# Patient Record
Sex: FEMALE | Race: WHITE | Employment: FULL TIME | URBAN - METROPOLITAN AREA
[De-identification: names, ages, dates, MRNs, and addresses within clinical notes are randomized per-mention and may not be internally consistent; named-entity substitution may affect disease eponyms.]

---

## 2017-06-28 ENCOUNTER — HOSPITAL ENCOUNTER (OUTPATIENT)
Age: 42
Discharge: HOME | End: 2017-06-28
Attending: EMERGENCY MEDICINE
Payer: COMMERCIAL

## 2017-06-28 VITALS
DIASTOLIC BLOOD PRESSURE: 77 MMHG | SYSTOLIC BLOOD PRESSURE: 120 MMHG | OXYGEN SATURATION: 100 % | HEART RATE: 86 BPM | TEMPERATURE: 99 F | RESPIRATION RATE: 16 BRPM

## 2017-06-28 DIAGNOSIS — N30.00 ACUTE CYSTITIS WITHOUT HEMATURIA: Primary | ICD-10-CM

## 2017-06-28 PROCEDURE — 87186 SC STD MICRODIL/AGAR DIL: CPT | Performed by: EMERGENCY MEDICINE

## 2017-06-28 PROCEDURE — 87088 URINE BACTERIA CULTURE: CPT | Performed by: EMERGENCY MEDICINE

## 2017-06-28 PROCEDURE — 81002 URINALYSIS NONAUTO W/O SCOPE: CPT | Performed by: EMERGENCY MEDICINE

## 2017-06-28 PROCEDURE — 99214 OFFICE O/P EST MOD 30 MIN: CPT

## 2017-06-28 PROCEDURE — 87086 URINE CULTURE/COLONY COUNT: CPT | Performed by: EMERGENCY MEDICINE

## 2017-06-28 PROCEDURE — 81025 URINE PREGNANCY TEST: CPT | Performed by: EMERGENCY MEDICINE

## 2017-06-28 RX ORDER — NITROFURANTOIN 25; 75 MG/1; MG/1
100 CAPSULE ORAL 2 TIMES DAILY
Qty: 14 CAPSULE | Refills: 0 | Status: SHIPPED | OUTPATIENT
Start: 2017-06-28 | End: 2017-07-05

## 2017-06-28 RX ORDER — PHENAZOPYRIDINE HYDROCHLORIDE 200 MG/1
200 TABLET, FILM COATED ORAL 3 TIMES DAILY PRN
Qty: 6 TABLET | Refills: 0 | Status: SHIPPED | OUTPATIENT
Start: 2017-06-28 | End: 2017-07-05

## 2017-06-29 NOTE — ED PROVIDER NOTES
Patient presents with:  UTI    HPI:     Darvin Parada is a 39year old female who presents with chief complaint of dysuria, suprapubic pressure. Started with slight discomfort two days ago. Yesterday a little nauseous.   Now today with urgency, frequenc and pyridium Rx  2. Urine cx  3. F/u with PCP in 3 days as needed for re-evaluation, sooner if symptoms worsen      All results reviewed and discussed with patient. See AVS for detailed discharge instructions.

## 2017-09-05 ENCOUNTER — CHARTING TRANS (OUTPATIENT)
Dept: OTHER | Age: 42
End: 2017-09-05

## 2017-09-05 ENCOUNTER — LAB SERVICES (OUTPATIENT)
Dept: OTHER | Age: 42
End: 2017-09-05

## 2017-09-05 LAB
APPEARANCE: NORMAL
BILIRUBIN: NORMAL
COLOR: YELLOW
GLUCOSE U: NORMAL
KETONES: NORMAL
LEUKOCYTES: NORMAL
NITRITE: NORMAL
OCCULT BLOOD: NORMAL
PH: 7
PROTEIN: NORMAL
URINE SPEC GRAVITY: 1
UROBILINOGEN: 0.2

## 2017-09-07 LAB — BACTERIA UR CULT: NORMAL

## 2018-11-02 VITALS
RESPIRATION RATE: 16 BRPM | SYSTOLIC BLOOD PRESSURE: 130 MMHG | TEMPERATURE: 98.6 F | DIASTOLIC BLOOD PRESSURE: 76 MMHG | HEART RATE: 90 BPM

## 2019-07-31 ENCOUNTER — WALK IN (OUTPATIENT)
Dept: URGENT CARE | Age: 44
End: 2019-07-31

## 2019-07-31 DIAGNOSIS — H10.9 BACTERIAL CONJUNCTIVITIS: Primary | ICD-10-CM

## 2019-07-31 PROCEDURE — 99214 OFFICE O/P EST MOD 30 MIN: CPT | Performed by: NURSE PRACTITIONER

## 2019-07-31 RX ORDER — TOBRAMYCIN 3 MG/ML
1 SOLUTION/ DROPS OPHTHALMIC EVERY 6 HOURS
Qty: 5 ML | Refills: 0 | Status: SHIPPED | OUTPATIENT
Start: 2019-07-31 | End: 2019-08-07

## 2019-07-31 RX ORDER — ESCITALOPRAM OXALATE 10 MG/1
10 TABLET ORAL DAILY
COMMUNITY

## 2019-07-31 RX ORDER — DEXTROAMPHETAMINE SACCHARATE, AMPHETAMINE ASPARTATE, DEXTROAMPHETAMINE SULFATE AND AMPHETAMINE SULFATE 7.5; 7.5; 7.5; 7.5 MG/1; MG/1; MG/1; MG/1
20 TABLET ORAL 2 TIMES DAILY
COMMUNITY

## 2019-07-31 RX ORDER — ATOMOXETINE 100 MG/1
100 CAPSULE ORAL DAILY
COMMUNITY

## 2019-07-31 RX ORDER — QUETIAPINE FUMARATE 100 MG/1
400 TABLET, FILM COATED ORAL DAILY
COMMUNITY

## 2019-07-31 RX ORDER — LEVOTHYROXINE SODIUM 0.1 MG/1
50 TABLET ORAL DAILY
COMMUNITY

## 2019-07-31 ASSESSMENT — ENCOUNTER SYMPTOMS
PHOTOPHOBIA: 0
EYE DISCHARGE: 1
EYE REDNESS: 1
EYE PAIN: 0
CONSTITUTIONAL NEGATIVE: 1
EYE ITCHING: 1
RESPIRATORY NEGATIVE: 1

## 2019-09-25 ENCOUNTER — HOSPITAL ENCOUNTER (OUTPATIENT)
Age: 44
Discharge: HOME | End: 2019-09-25
Attending: FAMILY MEDICINE
Payer: COMMERCIAL

## 2019-09-25 VITALS
DIASTOLIC BLOOD PRESSURE: 65 MMHG | TEMPERATURE: 97 F | OXYGEN SATURATION: 98 % | HEART RATE: 102 BPM | WEIGHT: 130 LBS | HEIGHT: 62 IN | BODY MASS INDEX: 23.92 KG/M2 | RESPIRATION RATE: 20 BRPM | SYSTOLIC BLOOD PRESSURE: 107 MMHG

## 2019-09-25 DIAGNOSIS — Z87.19 HISTORY OF CONSTIPATION: ICD-10-CM

## 2019-09-25 DIAGNOSIS — N12 PYELONEPHRITIS: Primary | ICD-10-CM

## 2019-09-25 LAB
POCT BILIRUBIN URINE: NEGATIVE
POCT GLUCOSE URINE: NEGATIVE MG/DL
POCT KETONE URINE: NEGATIVE MG/DL
POCT NITRITE URINE: NEGATIVE
POCT PH URINE: 7.5 (ref 5–8)
POCT SPECIFIC GRAVITY URINE: 1.01
POCT URINE COLOR: YELLOW
POCT URINE PREGNANCY: NEGATIVE
POCT UROBILINOGEN URINE: 0.2 MG/DL

## 2019-09-25 PROCEDURE — 99214 OFFICE O/P EST MOD 30 MIN: CPT

## 2019-09-25 PROCEDURE — 81025 URINE PREGNANCY TEST: CPT | Performed by: FAMILY MEDICINE

## 2019-09-25 PROCEDURE — 87077 CULTURE AEROBIC IDENTIFY: CPT | Performed by: FAMILY MEDICINE

## 2019-09-25 PROCEDURE — 81002 URINALYSIS NONAUTO W/O SCOPE: CPT | Performed by: FAMILY MEDICINE

## 2019-09-25 PROCEDURE — 87186 SC STD MICRODIL/AGAR DIL: CPT | Performed by: FAMILY MEDICINE

## 2019-09-25 PROCEDURE — 87086 URINE CULTURE/COLONY COUNT: CPT | Performed by: FAMILY MEDICINE

## 2019-09-25 RX ORDER — CIPROFLOXACIN 500 MG/1
500 TABLET, FILM COATED ORAL 2 TIMES DAILY
Qty: 20 TABLET | Refills: 0 | Status: SHIPPED | OUTPATIENT
Start: 2019-09-25 | End: 2019-10-05

## 2019-09-25 NOTE — ED PROVIDER NOTES
Patient Seen in: [address] [facility]      History   Patient presents with:   Body ache and/or chills  Low Back Pain  Fever (infectious)    Stated Complaint: body aches/fever    HPI  This is a 28-year-old female coming in with lower back pain, Not in any acute distress, making good conversation, answering appropriately   SKIN: No pallor, no erythema, no cyanosis, warm and dry  Eyes: wnl, normal conjunctiva   HEAD: Normocephalic, atraumatic  EENT: OP - wnl, moist, Nares normal  NECK: FROM, supple increase urinary flow by hydrating well   Worsening of symptoms please GO TO THE ER           Disposition and Plan     Clinical Impression:  Pyelonephritis  (primary encounter diagnosis)  History of constipation    Disposition:  Discharge  9/25/2019 10:57

## 2019-09-27 NOTE — ED NOTES
Attempted to call patient with urine culture results. No voicemail or answer. Urine culture is positive, but sensitive to Cipro should patient call for results. Unable to leave message.

## 2019-10-19 PROBLEM — Z98.890 H/O LEEP: Status: ACTIVE | Noted: 2019-10-19

## 2019-10-19 PROBLEM — Z97.5 IUD CONTRACEPTION: Status: ACTIVE | Noted: 2019-10-19

## 2019-10-19 PROCEDURE — 88175 CYTOPATH C/V AUTO FLUID REDO: CPT | Performed by: OBSTETRICS & GYNECOLOGY

## 2019-10-19 PROCEDURE — 87624 HPV HI-RISK TYP POOLED RSLT: CPT | Performed by: OBSTETRICS & GYNECOLOGY

## 2019-10-24 PROBLEM — E21.3 HYPERPARATHYROIDISM (HCC): Status: ACTIVE | Noted: 2019-10-24

## 2019-10-24 PROBLEM — Z98.890 S/P LAPAROSCOPY: Status: ACTIVE | Noted: 2019-10-24

## 2019-12-14 ENCOUNTER — WALK IN (OUTPATIENT)
Dept: URGENT CARE | Age: 44
End: 2019-12-14

## 2019-12-14 VITALS
HEART RATE: 92 BPM | BODY MASS INDEX: 24.84 KG/M2 | RESPIRATION RATE: 16 BRPM | HEIGHT: 62 IN | SYSTOLIC BLOOD PRESSURE: 126 MMHG | WEIGHT: 135 LBS | DIASTOLIC BLOOD PRESSURE: 78 MMHG | TEMPERATURE: 98.8 F | OXYGEN SATURATION: 100 %

## 2019-12-14 DIAGNOSIS — J01.90 ACUTE NON-RECURRENT SINUSITIS, UNSPECIFIED LOCATION: Primary | ICD-10-CM

## 2019-12-14 DIAGNOSIS — H10.9 BACTERIAL CONJUNCTIVITIS: ICD-10-CM

## 2019-12-14 PROCEDURE — 99203 OFFICE O/P NEW LOW 30 MIN: CPT | Performed by: NURSE PRACTITIONER

## 2019-12-14 RX ORDER — ESCITALOPRAM OXALATE 10 MG/1
10 TABLET ORAL DAILY
Refills: 3 | COMMUNITY
Start: 2019-11-16

## 2019-12-14 RX ORDER — DOXYCYCLINE HYCLATE 100 MG
100 TABLET ORAL 2 TIMES DAILY
Qty: 14 TABLET | Refills: 0 | Status: SHIPPED | OUTPATIENT
Start: 2019-12-14 | End: 2019-12-21

## 2019-12-14 RX ORDER — DEXTROAMPHETAMINE SACCHARATE, AMPHETAMINE ASPARTATE, DEXTROAMPHETAMINE SULFATE AND AMPHETAMINE SULFATE 2.5; 2.5; 2.5; 2.5 MG/1; MG/1; MG/1; MG/1
TABLET ORAL
Refills: 0 | COMMUNITY
Start: 2019-11-15

## 2019-12-14 RX ORDER — FLUOCINOLONE ACETONIDE 0.11 MG/ML
OIL AURICULAR (OTIC)
Refills: 11 | COMMUNITY
Start: 2019-10-14

## 2019-12-14 RX ORDER — DEXTROAMPHETAMINE SACCHARATE, AMPHETAMINE ASPARTATE MONOHYDRATE, DEXTROAMPHETAMINE SULFATE AND AMPHETAMINE SULFATE 5; 5; 5; 5 MG/1; MG/1; MG/1; MG/1
CAPSULE, EXTENDED RELEASE ORAL
Refills: 0 | COMMUNITY
Start: 2019-11-15

## 2019-12-14 RX ORDER — ATOMOXETINE 100 MG/1
CAPSULE ORAL
Refills: 1 | COMMUNITY
Start: 2019-09-08

## 2019-12-14 RX ORDER — LEVOTHYROXINE SODIUM 0.05 MG/1
TABLET ORAL
Refills: 3 | COMMUNITY
Start: 2019-10-14

## 2019-12-14 RX ORDER — POLYMYXIN B SULFATE AND TRIMETHOPRIM 1; 10000 MG/ML; [USP'U]/ML
1 SOLUTION OPHTHALMIC
Qty: 10 ML | Refills: 0 | Status: SHIPPED | OUTPATIENT
Start: 2019-12-14 | End: 2019-12-21

## 2019-12-14 ASSESSMENT — ENCOUNTER SYMPTOMS
ABDOMINAL PAIN: 0
DIZZINESS: 0
VOMITING: 0
PHOTOPHOBIA: 0
SEIZURES: 0
SORE THROAT: 0
EYE DISCHARGE: 1
EYE ITCHING: 0
EYE PAIN: 0
DIARRHEA: 0
RHINORRHEA: 0
SINUS PAIN: 0
SINUS PRESSURE: 1
SHORTNESS OF BREATH: 0
COUGH: 0
FEVER: 0
EYE REDNESS: 1
HEADACHES: 0
CHILLS: 0

## 2020-12-29 ENCOUNTER — LAB ENCOUNTER (OUTPATIENT)
Dept: LAB | Age: 45
End: 2020-12-29
Attending: OTOLARYNGOLOGY
Payer: COMMERCIAL

## 2020-12-29 DIAGNOSIS — E07.9 THYROID DYSFUNCTION: ICD-10-CM

## 2020-12-29 LAB
T4 FREE SERPL-MCNC: 0.8 NG/DL (ref 0.8–1.7)
TSI SER-ACNC: 2.66 MIU/ML (ref 0.36–3.74)

## 2020-12-29 PROCEDURE — 84443 ASSAY THYROID STIM HORMONE: CPT

## 2020-12-29 PROCEDURE — 36415 COLL VENOUS BLD VENIPUNCTURE: CPT

## 2020-12-29 PROCEDURE — 84439 ASSAY OF FREE THYROXINE: CPT

## 2021-08-10 ENCOUNTER — APPOINTMENT (OUTPATIENT)
Dept: CT IMAGING | Facility: HOSPITAL | Age: 46
End: 2021-08-10
Attending: EMERGENCY MEDICINE
Payer: COMMERCIAL

## 2021-08-10 ENCOUNTER — HOSPITAL ENCOUNTER (EMERGENCY)
Facility: HOSPITAL | Age: 46
Discharge: HOME | End: 2021-08-10
Attending: EMERGENCY MEDICINE
Payer: COMMERCIAL

## 2021-08-10 VITALS
HEIGHT: 62 IN | WEIGHT: 138 LBS | TEMPERATURE: 99 F | SYSTOLIC BLOOD PRESSURE: 110 MMHG | OXYGEN SATURATION: 99 % | HEART RATE: 96 BPM | RESPIRATION RATE: 17 BRPM | BODY MASS INDEX: 25.4 KG/M2 | DIASTOLIC BLOOD PRESSURE: 69 MMHG

## 2021-08-10 DIAGNOSIS — R10.9 FLANK PAIN: ICD-10-CM

## 2021-08-10 DIAGNOSIS — N12 PYELONEPHRITIS: Primary | ICD-10-CM

## 2021-08-10 LAB
ALBUMIN SERPL-MCNC: 3.2 G/DL (ref 3.4–5)
ALBUMIN/GLOB SERPL: 0.8 {RATIO} (ref 1–2)
ALP LIVER SERPL-CCNC: 91 U/L
ALT SERPL-CCNC: 34 U/L
ANION GAP SERPL CALC-SCNC: 5 MMOL/L (ref 0–18)
AST SERPL-CCNC: 21 U/L (ref 15–37)
ATRIAL RATE: 109 BPM
BASOPHILS # BLD AUTO: 0.01 X10(3) UL (ref 0–0.2)
BASOPHILS NFR BLD AUTO: 0.1 %
BILIRUB SERPL-MCNC: 0.4 MG/DL (ref 0.1–2)
BILIRUB UR QL STRIP.AUTO: NEGATIVE
BUN BLD-MCNC: 7 MG/DL (ref 7–18)
CALCIUM BLD-MCNC: 8.1 MG/DL (ref 8.5–10.1)
CHLORIDE SERPL-SCNC: 99 MMOL/L (ref 98–112)
CLARITY UR REFRACT.AUTO: CLEAR
CO2 SERPL-SCNC: 26 MMOL/L (ref 21–32)
COLOR UR AUTO: YELLOW
CREAT BLD-MCNC: 0.82 MG/DL
EOSINOPHIL # BLD AUTO: 0 X10(3) UL (ref 0–0.7)
EOSINOPHIL NFR BLD AUTO: 0 %
ERYTHROCYTE [DISTWIDTH] IN BLOOD BY AUTOMATED COUNT: 15.9 %
GLOBULIN PLAS-MCNC: 4.2 G/DL (ref 2.8–4.4)
GLUCOSE BLD-MCNC: 105 MG/DL (ref 70–99)
GLUCOSE UR STRIP.AUTO-MCNC: NEGATIVE MG/DL
HCT VFR BLD AUTO: 32.9 %
HGB BLD-MCNC: 10.5 G/DL
IMM GRANULOCYTES # BLD AUTO: 0.05 X10(3) UL (ref 0–1)
IMM GRANULOCYTES NFR BLD: 0.4 %
KETONES UR STRIP.AUTO-MCNC: NEGATIVE MG/DL
LACTATE SERPL-SCNC: 0.5 MMOL/L (ref 0.4–2)
LEUKOCYTE ESTERASE UR QL STRIP.AUTO: NEGATIVE
LIPASE SERPL-CCNC: 60 U/L (ref 73–393)
LYMPHOCYTES # BLD AUTO: 0.44 X10(3) UL (ref 1–4)
LYMPHOCYTES NFR BLD AUTO: 3.5 %
M PROTEIN MFR SERPL ELPH: 7.4 G/DL (ref 6.4–8.2)
MCH RBC QN AUTO: 24.5 PG (ref 26–34)
MCHC RBC AUTO-ENTMCNC: 31.9 G/DL (ref 31–37)
MCV RBC AUTO: 76.7 FL
MONOCYTES # BLD AUTO: 0.93 X10(3) UL (ref 0.1–1)
MONOCYTES NFR BLD AUTO: 7.4 %
NEUTROPHILS # BLD AUTO: 11.22 X10 (3) UL (ref 1.5–7.7)
NEUTROPHILS # BLD AUTO: 11.22 X10(3) UL (ref 1.5–7.7)
NEUTROPHILS NFR BLD AUTO: 88.6 %
NITRITE UR QL STRIP.AUTO: NEGATIVE
OSMOLALITY SERPL CALC.SUM OF ELEC: 268 MOSM/KG (ref 275–295)
P AXIS: 51 DEGREES
P-R INTERVAL: 128 MS
PH UR STRIP.AUTO: 8 [PH] (ref 5–8)
PLATELET # BLD AUTO: 290 10(3)UL (ref 150–450)
POTASSIUM SERPL-SCNC: 3.7 MMOL/L (ref 3.5–5.1)
PROT UR STRIP.AUTO-MCNC: NEGATIVE MG/DL
Q-T INTERVAL: 332 MS
QRS DURATION: 118 MS
QTC CALCULATION (BEZET): 447 MS
R AXIS: -10 DEGREES
RBC # BLD AUTO: 4.29 X10(6)UL
SARS-COV-2 RNA RESP QL NAA+PROBE: NOT DETECTED
SODIUM SERPL-SCNC: 130 MMOL/L (ref 136–145)
SP GR UR STRIP.AUTO: 1.01 (ref 1–1.03)
T AXIS: 96 DEGREES
TROPONIN I SERPL-MCNC: <0.045 NG/ML (ref ?–0.04)
UROBILINOGEN UR STRIP.AUTO-MCNC: <2 MG/DL
VENTRICULAR RATE: 109 BPM
WBC # BLD AUTO: 12.7 X10(3) UL (ref 4–11)

## 2021-08-10 PROCEDURE — 81001 URINALYSIS AUTO W/SCOPE: CPT | Performed by: EMERGENCY MEDICINE

## 2021-08-10 PROCEDURE — 74176 CT ABD & PELVIS W/O CONTRAST: CPT | Performed by: EMERGENCY MEDICINE

## 2021-08-10 PROCEDURE — 99285 EMERGENCY DEPT VISIT HI MDM: CPT

## 2021-08-10 PROCEDURE — 83605 ASSAY OF LACTIC ACID: CPT | Performed by: EMERGENCY MEDICINE

## 2021-08-10 PROCEDURE — 96375 TX/PRO/DX INJ NEW DRUG ADDON: CPT

## 2021-08-10 PROCEDURE — 96361 HYDRATE IV INFUSION ADD-ON: CPT

## 2021-08-10 PROCEDURE — 80053 COMPREHEN METABOLIC PANEL: CPT | Performed by: EMERGENCY MEDICINE

## 2021-08-10 PROCEDURE — 85025 COMPLETE CBC W/AUTO DIFF WBC: CPT | Performed by: EMERGENCY MEDICINE

## 2021-08-10 PROCEDURE — 84484 ASSAY OF TROPONIN QUANT: CPT | Performed by: EMERGENCY MEDICINE

## 2021-08-10 PROCEDURE — 93010 ELECTROCARDIOGRAM REPORT: CPT

## 2021-08-10 PROCEDURE — 83690 ASSAY OF LIPASE: CPT | Performed by: EMERGENCY MEDICINE

## 2021-08-10 PROCEDURE — 96365 THER/PROPH/DIAG IV INF INIT: CPT

## 2021-08-10 PROCEDURE — 93005 ELECTROCARDIOGRAM TRACING: CPT

## 2021-08-10 PROCEDURE — 81001 URINALYSIS AUTO W/SCOPE: CPT

## 2021-08-10 RX ORDER — ACETAMINOPHEN 325 MG/1
650 TABLET ORAL ONCE
Status: DISCONTINUED | OUTPATIENT
Start: 2021-08-10 | End: 2021-08-10

## 2021-08-10 RX ORDER — CEPHALEXIN 500 MG/1
500 CAPSULE ORAL 4 TIMES DAILY
Qty: 56 CAPSULE | Refills: 0 | Status: SHIPPED | OUTPATIENT
Start: 2021-08-10 | End: 2021-08-24

## 2021-08-10 RX ORDER — ACETAMINOPHEN 500 MG
1000 TABLET ORAL ONCE
Status: COMPLETED | OUTPATIENT
Start: 2021-08-10 | End: 2021-08-10

## 2021-08-10 RX ORDER — HYDROCODONE BITARTRATE AND ACETAMINOPHEN 5; 325 MG/1; MG/1
1-2 TABLET ORAL EVERY 6 HOURS PRN
Qty: 10 TABLET | Refills: 0 | Status: SHIPPED | OUTPATIENT
Start: 2021-08-10 | End: 2021-08-17

## 2021-08-10 RX ORDER — KETOROLAC TROMETHAMINE 30 MG/ML
15 INJECTION, SOLUTION INTRAMUSCULAR; INTRAVENOUS ONCE
Status: COMPLETED | OUTPATIENT
Start: 2021-08-10 | End: 2021-08-10

## 2021-08-10 RX ORDER — ACETAMINOPHEN 500 MG
TABLET ORAL
Status: COMPLETED
Start: 2021-08-10 | End: 2021-08-10

## 2021-08-10 RX ORDER — ACETAMINOPHEN 325 MG/1
TABLET ORAL
Status: DISCONTINUED
Start: 2021-08-10 | End: 2021-08-10 | Stop reason: WASHOUT

## 2021-08-10 NOTE — ED PROVIDER NOTES
Patient Seen in: [facility] Emergency Department      History   Patient presents with:  Abdomen/Flank Pain    Stated Complaint: abdominal pain, slight fever.      HPI/Subjective:   HPI    Patient is a 70-year-old female who states that 3 weeks ago sh resting  on the cart in no acute distress. HEENT: Extraocular muscles intact, pupils equal round reactive to light , neck supple, no meningismus. LUNGS: Lungs clear to auscultation bilaterally.   CARDIOVASCULAR: + S1-S2, regular rate and rhythm, no murmur -----------         ------                     CBC W/ DIFFERENTIAL[[id number]]          Abnormal            Final result                 Please view results for these tests on the individual orders.    RAINBOW DRAW LAVENDER   RAINBOW DRAW LIGHT GREE 1 capsule (500 mg total) by mouth 4 (four) times daily for 14 days. , Normal, Disp-56 capsule, R-0    HYDROcodone-acetaminophen 5-325 MG Oral Tab  Take 1-2 tablets by mouth every 6 (six) hours as needed for Pain., Normal, Disp-10 tablet, R-0

## 2021-08-10 NOTE — ED INITIAL ASSESSMENT (HPI)
Pt states she has a foul urine odor and iritation. Pt states urine was cloudy yesterday. Pt states having frequent UTIs. Pt c/o right flank pain that radiates down bilateral legs.

## 2021-08-13 ENCOUNTER — TELEPHONE (OUTPATIENT)
Dept: SURGERY | Facility: CLINIC | Age: 46
End: 2021-08-13

## 2021-08-13 NOTE — TELEPHONE ENCOUNTER
I spoke to MPH and he stated that the pt could follow up with [facility]. I discussed that her next opening is on 8/24 and he stated this would be an appropriate time frame for the patient. I called the pt and discussed this with her.  Pt verbalized understanding

## 2021-08-13 NOTE — TELEPHONE ENCOUNTER
Per pt was seen at [address] [place] [address] ED on 8/10, states she was seen for kidney infection, states she needs f/u appt in 3 days, no appts available. Please advise thank you.

## 2021-08-23 ENCOUNTER — TELEPHONE (OUTPATIENT)
Dept: SURGERY | Facility: CLINIC | Age: 46
End: 2021-08-23

## 2021-09-08 ENCOUNTER — OFFICE VISIT (OUTPATIENT)
Dept: SURGERY | Facility: CLINIC | Age: 46
End: 2021-09-08
Payer: COMMERCIAL

## 2021-09-08 VITALS
DIASTOLIC BLOOD PRESSURE: 64 MMHG | HEIGHT: 62 IN | HEART RATE: 83 BPM | SYSTOLIC BLOOD PRESSURE: 121 MMHG | BODY MASS INDEX: 25.4 KG/M2 | WEIGHT: 138 LBS

## 2021-09-08 DIAGNOSIS — R31.0 GROSS HEMATURIA: Primary | ICD-10-CM

## 2021-09-08 DIAGNOSIS — R93.89 ABNORMAL FINDING ON CT SCAN: ICD-10-CM

## 2021-09-08 PROCEDURE — 3008F BODY MASS INDEX DOCD: CPT | Performed by: NURSE PRACTITIONER

## 2021-09-08 PROCEDURE — 3078F DIAST BP <80 MM HG: CPT | Performed by: NURSE PRACTITIONER

## 2021-09-08 PROCEDURE — 3074F SYST BP LT 130 MM HG: CPT | Performed by: NURSE PRACTITIONER

## 2021-09-08 PROCEDURE — 99203 OFFICE O/P NEW LOW 30 MIN: CPT | Performed by: NURSE PRACTITIONER

## 2021-09-08 NOTE — PROGRESS NOTES
HPI/Subjective:     Patient ID: Florentin Score is a 55year old female. HPI     Patient is a 55year old female who presents to the clinic for an initial evaluation regarding pyelonephritis. Past medical history of hyperparathyroidism.       Patient wa mouth 2 (two) times daily. • Atomoxetine HCl (STRATTERA) 100 MG Oral Cap Take by mouth daily. • QUEtiapine Fumarate (SEROQUEL) 400 MG Oral Tab Take 400 mg by mouth nightly.      • TRIAMCINOLONE ACETONIDE 0.1 % External Cream APPLY TWICE DAILY AS DIR motion. Cervical back: Normal range of motion. Skin:     General: Skin is warm and dry. Neurological:      General: No focal deficit present. Mental Status: She is alert and oriented to person, place, and time.    Psychiatric:         Mood and

## 2022-04-16 ENCOUNTER — HOSPITAL ENCOUNTER (OUTPATIENT)
Dept: CT IMAGING | Age: 47
Discharge: HOME | End: 2022-04-16
Attending: NURSE PRACTITIONER
Payer: COMMERCIAL

## 2022-04-16 DIAGNOSIS — R31.0 GROSS HEMATURIA: ICD-10-CM

## 2022-04-16 DIAGNOSIS — R93.89 ABNORMAL FINDING ON CT SCAN: ICD-10-CM

## 2022-04-16 LAB — CREAT BLD-MCNC: 0.7 MG/DL

## 2022-04-16 PROCEDURE — 74178 CT ABD&PLV WO CNTR FLWD CNTR: CPT | Performed by: NURSE PRACTITIONER

## 2022-04-16 PROCEDURE — 82565 ASSAY OF CREATININE: CPT

## 2022-04-16 PROCEDURE — 76377 3D RENDER W/INTRP POSTPROCES: CPT | Performed by: NURSE PRACTITIONER

## 2022-04-16 RX ORDER — IOHEXOL 350 MG/ML
100 INJECTION, SOLUTION INTRAVENOUS
Status: COMPLETED | OUTPATIENT
Start: 2022-04-16 | End: 2022-04-16

## 2022-04-16 RX ADMIN — IOHEXOL 100 ML: 350 INJECTION, SOLUTION INTRAVENOUS at 16:15:00

## 2022-04-17 ENCOUNTER — PATIENT MESSAGE (OUTPATIENT)
Dept: SURGERY | Facility: CLINIC | Age: 47
End: 2022-04-17

## 2022-04-18 NOTE — TELEPHONE ENCOUNTER
From: Kae Aviles  To: ERIKA Marrero  Sent: 4/17/2022 8:42 PM CDT  Subject: Question regarding CT Urogram    Not sure how to read the results. I have my obgyn appointment on Tuesday.

## 2022-04-19 PROCEDURE — 87624 HPV HI-RISK TYP POOLED RSLT: CPT | Performed by: OBSTETRICS & GYNECOLOGY

## 2022-04-24 ENCOUNTER — HOSPITAL ENCOUNTER (OUTPATIENT)
Age: 47
Discharge: HOME | End: 2022-04-24
Payer: COMMERCIAL

## 2022-04-24 VITALS
HEART RATE: 103 BPM | BODY MASS INDEX: 25.76 KG/M2 | HEIGHT: 62 IN | DIASTOLIC BLOOD PRESSURE: 70 MMHG | TEMPERATURE: 99 F | OXYGEN SATURATION: 100 % | RESPIRATION RATE: 18 BRPM | WEIGHT: 140 LBS | SYSTOLIC BLOOD PRESSURE: 124 MMHG

## 2022-04-24 DIAGNOSIS — N30.90 CYSTITIS: ICD-10-CM

## 2022-04-24 DIAGNOSIS — U07.1 COVID-19: Primary | ICD-10-CM

## 2022-04-24 LAB
B-HCG UR QL: NEGATIVE
POCT BILIRUBIN URINE: NEGATIVE
POCT GLUCOSE URINE: NEGATIVE MG/DL
POCT KETONE URINE: NEGATIVE MG/DL
POCT NITRITE URINE: NEGATIVE
POCT PH URINE: 6.5 (ref 5–8)
POCT PROTEIN URINE: NEGATIVE MG/DL
POCT SPECIFIC GRAVITY URINE: 1.02
POCT URINE CLARITY: CLEAR
POCT URINE COLOR: YELLOW
POCT UROBILINOGEN URINE: 0.2 MG/DL
SARS-COV-2 RNA RESP QL NAA+PROBE: DETECTED

## 2022-04-24 PROCEDURE — 87088 URINE BACTERIA CULTURE: CPT | Performed by: PHYSICIAN ASSISTANT

## 2022-04-24 PROCEDURE — 87086 URINE CULTURE/COLONY COUNT: CPT | Performed by: PHYSICIAN ASSISTANT

## 2022-04-24 RX ORDER — SULFAMETHOXAZOLE AND TRIMETHOPRIM 800; 160 MG/1; MG/1
1 TABLET ORAL 2 TIMES DAILY
Qty: 14 TABLET | Refills: 0 | Status: SHIPPED | OUTPATIENT
Start: 2022-04-24 | End: 2022-05-01

## 2022-04-24 NOTE — ED INITIAL ASSESSMENT (HPI)
For the past 3 days, patient complains of fevers of 100, thoracic back pain, runny nose, head aches, increased urinary frequency, urgency, malodorous urine and cramping/pain. Denies blood in urine. Exposed to covid.

## 2022-04-25 ENCOUNTER — HOSPITAL ENCOUNTER (OUTPATIENT)
Age: 47
Discharge: HOME | End: 2022-04-25
Payer: COMMERCIAL

## 2022-04-25 VITALS
OXYGEN SATURATION: 100 % | TEMPERATURE: 98 F | DIASTOLIC BLOOD PRESSURE: 60 MMHG | HEART RATE: 97 BPM | SYSTOLIC BLOOD PRESSURE: 107 MMHG | RESPIRATION RATE: 12 BRPM

## 2022-04-25 DIAGNOSIS — U07.1 ACUTE COVID-19: Primary | ICD-10-CM

## 2022-04-25 PROCEDURE — M0222 INTRAVENOUS INJECTION, BEBTELOVIMAB, INCLUDES INJECTION AND POST ADMINISTRATIVE MONITORING: HCPCS | Performed by: NURSE PRACTITIONER

## 2022-04-25 RX ORDER — BEBTELOVIMAB 87.5 MG/ML
175 INJECTION, SOLUTION INTRAVENOUS ONCE
Status: COMPLETED | OUTPATIENT
Start: 2022-04-25 | End: 2022-04-25

## 2022-04-25 NOTE — ED INITIAL ASSESSMENT (HPI)
Pt diagnosed with covid yesterday, here today for antibody infusion. Pt states her fever is gone, now has fatigue and runny nose.

## 2022-06-09 ENCOUNTER — ANESTHESIA EVENT (OUTPATIENT)
Dept: SURGERY | Facility: HOSPITAL | Age: 47
End: 2022-06-09
Payer: COMMERCIAL

## 2022-06-09 ENCOUNTER — ANESTHESIA (OUTPATIENT)
Dept: SURGERY | Facility: HOSPITAL | Age: 47
End: 2022-06-09
Payer: COMMERCIAL

## 2022-06-09 ENCOUNTER — HOSPITAL ENCOUNTER (OUTPATIENT)
Facility: HOSPITAL | Age: 47
Setting detail: HOSPITAL OUTPATIENT SURGERY
Discharge: HOME | End: 2022-06-09
Attending: OBSTETRICS & GYNECOLOGY | Admitting: OBSTETRICS & GYNECOLOGY
Payer: COMMERCIAL

## 2022-06-09 VITALS
BODY MASS INDEX: 25.19 KG/M2 | TEMPERATURE: 98 F | SYSTOLIC BLOOD PRESSURE: 118 MMHG | RESPIRATION RATE: 20 BRPM | WEIGHT: 136.88 LBS | HEIGHT: 62 IN | OXYGEN SATURATION: 100 % | DIASTOLIC BLOOD PRESSURE: 76 MMHG | HEART RATE: 66 BPM

## 2022-06-09 PROBLEM — Z98.51 H/O TUBAL LIGATION: Status: ACTIVE | Noted: 2022-06-09

## 2022-06-09 LAB — B-HCG UR QL: NEGATIVE

## 2022-06-09 PROCEDURE — 88302 TISSUE EXAM BY PATHOLOGIST: CPT | Performed by: OBSTETRICS & GYNECOLOGY

## 2022-06-09 PROCEDURE — 81025 URINE PREGNANCY TEST: CPT

## 2022-06-09 PROCEDURE — 0UT74ZZ RESECTION OF BILATERAL FALLOPIAN TUBES, PERCUTANEOUS ENDOSCOPIC APPROACH: ICD-10-PCS | Performed by: OBSTETRICS & GYNECOLOGY

## 2022-06-09 PROCEDURE — 88300 SURGICAL PATH GROSS: CPT | Performed by: OBSTETRICS & GYNECOLOGY

## 2022-06-09 PROCEDURE — 0UPD7HZ REMOVAL OF CONTRACEPTIVE DEVICE FROM UTERUS AND CERVIX, VIA NATURAL OR ARTIFICIAL OPENING: ICD-10-PCS | Performed by: OBSTETRICS & GYNECOLOGY

## 2022-06-09 RX ORDER — LIDOCAINE HYDROCHLORIDE 10 MG/ML
INJECTION, SOLUTION EPIDURAL; INFILTRATION; INTRACAUDAL; PERINEURAL AS NEEDED
Status: DISCONTINUED | OUTPATIENT
Start: 2022-06-09 | End: 2022-06-09 | Stop reason: SURG

## 2022-06-09 RX ORDER — KETOROLAC TROMETHAMINE 30 MG/ML
INJECTION, SOLUTION INTRAMUSCULAR; INTRAVENOUS AS NEEDED
Status: DISCONTINUED | OUTPATIENT
Start: 2022-06-09 | End: 2022-06-09 | Stop reason: SURG

## 2022-06-09 RX ORDER — HYDROMORPHONE HYDROCHLORIDE 1 MG/ML
0.4 INJECTION, SOLUTION INTRAMUSCULAR; INTRAVENOUS; SUBCUTANEOUS EVERY 5 MIN PRN
Status: DISCONTINUED | OUTPATIENT
Start: 2022-06-09 | End: 2022-06-09

## 2022-06-09 RX ORDER — DEXAMETHASONE SODIUM PHOSPHATE 4 MG/ML
VIAL (ML) INJECTION AS NEEDED
Status: DISCONTINUED | OUTPATIENT
Start: 2022-06-09 | End: 2022-06-09 | Stop reason: SURG

## 2022-06-09 RX ORDER — HYDROCODONE BITARTRATE AND ACETAMINOPHEN 5; 325 MG/1; MG/1
2 TABLET ORAL ONCE AS NEEDED
Status: COMPLETED | OUTPATIENT
Start: 2022-06-09 | End: 2022-06-09

## 2022-06-09 RX ORDER — HYDROMORPHONE HYDROCHLORIDE 1 MG/ML
0.6 INJECTION, SOLUTION INTRAMUSCULAR; INTRAVENOUS; SUBCUTANEOUS EVERY 5 MIN PRN
Status: DISCONTINUED | OUTPATIENT
Start: 2022-06-09 | End: 2022-06-09

## 2022-06-09 RX ORDER — NALOXONE HYDROCHLORIDE 0.4 MG/ML
80 INJECTION, SOLUTION INTRAMUSCULAR; INTRAVENOUS; SUBCUTANEOUS AS NEEDED
Status: DISCONTINUED | OUTPATIENT
Start: 2022-06-09 | End: 2022-06-09

## 2022-06-09 RX ORDER — HYDROCODONE BITARTRATE AND ACETAMINOPHEN 5; 325 MG/1; MG/1
1 TABLET ORAL ONCE AS NEEDED
Status: COMPLETED | OUTPATIENT
Start: 2022-06-09 | End: 2022-06-09

## 2022-06-09 RX ORDER — ROPIVACAINE HYDROCHLORIDE 5 MG/ML
INJECTION, SOLUTION EPIDURAL; INFILTRATION; PERINEURAL AS NEEDED
Status: DISCONTINUED | OUTPATIENT
Start: 2022-06-09 | End: 2022-06-09 | Stop reason: HOSPADM

## 2022-06-09 RX ORDER — HYDROMORPHONE HYDROCHLORIDE 1 MG/ML
0.2 INJECTION, SOLUTION INTRAMUSCULAR; INTRAVENOUS; SUBCUTANEOUS EVERY 5 MIN PRN
Status: DISCONTINUED | OUTPATIENT
Start: 2022-06-09 | End: 2022-06-09

## 2022-06-09 RX ORDER — ROCURONIUM BROMIDE 10 MG/ML
INJECTION, SOLUTION INTRAVENOUS AS NEEDED
Status: DISCONTINUED | OUTPATIENT
Start: 2022-06-09 | End: 2022-06-09 | Stop reason: SURG

## 2022-06-09 RX ORDER — IBUPROFEN 600 MG/1
600 TABLET ORAL EVERY 6 HOURS PRN
Qty: 30 TABLET | Refills: 1 | Status: SHIPPED | OUTPATIENT
Start: 2022-06-09

## 2022-06-09 RX ORDER — SODIUM CHLORIDE, SODIUM LACTATE, POTASSIUM CHLORIDE, CALCIUM CHLORIDE 600; 310; 30; 20 MG/100ML; MG/100ML; MG/100ML; MG/100ML
INJECTION, SOLUTION INTRAVENOUS CONTINUOUS
Status: DISCONTINUED | OUTPATIENT
Start: 2022-06-09 | End: 2022-06-09

## 2022-06-09 RX ORDER — ACETAMINOPHEN 500 MG
1000 TABLET ORAL ONCE AS NEEDED
Status: COMPLETED | OUTPATIENT
Start: 2022-06-09 | End: 2022-06-09

## 2022-06-09 RX ORDER — SCOLOPAMINE TRANSDERMAL SYSTEM 1 MG/1
1 PATCH, EXTENDED RELEASE TRANSDERMAL ONCE
Status: DISCONTINUED | OUTPATIENT
Start: 2022-06-09 | End: 2022-06-09 | Stop reason: HOSPADM

## 2022-06-09 RX ORDER — ONDANSETRON 2 MG/ML
4 INJECTION INTRAMUSCULAR; INTRAVENOUS EVERY 6 HOURS PRN
Status: DISCONTINUED | OUTPATIENT
Start: 2022-06-09 | End: 2022-06-09

## 2022-06-09 RX ORDER — ONDANSETRON 2 MG/ML
INJECTION INTRAMUSCULAR; INTRAVENOUS AS NEEDED
Status: DISCONTINUED | OUTPATIENT
Start: 2022-06-09 | End: 2022-06-09 | Stop reason: SURG

## 2022-06-09 RX ORDER — PROCHLORPERAZINE EDISYLATE 5 MG/ML
5 INJECTION INTRAMUSCULAR; INTRAVENOUS EVERY 8 HOURS PRN
Status: DISCONTINUED | OUTPATIENT
Start: 2022-06-09 | End: 2022-06-09

## 2022-06-09 RX ORDER — GLYCOPYRROLATE 0.2 MG/ML
INJECTION, SOLUTION INTRAMUSCULAR; INTRAVENOUS AS NEEDED
Status: DISCONTINUED | OUTPATIENT
Start: 2022-06-09 | End: 2022-06-09 | Stop reason: SURG

## 2022-06-09 RX ORDER — ACETAMINOPHEN 500 MG
1000 TABLET ORAL ONCE
Status: COMPLETED | OUTPATIENT
Start: 2022-06-09 | End: 2022-06-09

## 2022-06-09 RX ORDER — NEOSTIGMINE METHYLSULFATE 1 MG/ML
INJECTION INTRAVENOUS AS NEEDED
Status: DISCONTINUED | OUTPATIENT
Start: 2022-06-09 | End: 2022-06-09 | Stop reason: SURG

## 2022-06-09 RX ADMIN — LIDOCAINE HYDROCHLORIDE 50 MG: 10 INJECTION, SOLUTION EPIDURAL; INFILTRATION; INTRACAUDAL; PERINEURAL at 13:35:00

## 2022-06-09 RX ADMIN — KETOROLAC TROMETHAMINE 30 MG: 30 INJECTION, SOLUTION INTRAMUSCULAR; INTRAVENOUS at 14:07:00

## 2022-06-09 RX ADMIN — NEOSTIGMINE METHYLSULFATE 1.5 MG: 1 INJECTION INTRAVENOUS at 14:09:00

## 2022-06-09 RX ADMIN — DEXAMETHASONE SODIUM PHOSPHATE 4 MG: 4 MG/ML VIAL (ML) INJECTION at 13:40:00

## 2022-06-09 RX ADMIN — GLYCOPYRROLATE 0.4 MG: 0.2 INJECTION, SOLUTION INTRAMUSCULAR; INTRAVENOUS at 14:10:00

## 2022-06-09 RX ADMIN — NEOSTIGMINE METHYLSULFATE 1.5 MG: 1 INJECTION INTRAVENOUS at 14:10:00

## 2022-06-09 RX ADMIN — GLYCOPYRROLATE 0.3 MG: 0.2 INJECTION, SOLUTION INTRAMUSCULAR; INTRAVENOUS at 14:09:00

## 2022-06-09 RX ADMIN — SODIUM CHLORIDE, SODIUM LACTATE, POTASSIUM CHLORIDE, CALCIUM CHLORIDE: 600; 310; 30; 20 INJECTION, SOLUTION INTRAVENOUS at 14:35:00

## 2022-06-09 RX ADMIN — ROCURONIUM BROMIDE 40 MG: 10 INJECTION, SOLUTION INTRAVENOUS at 13:35:00

## 2022-06-09 RX ADMIN — SODIUM CHLORIDE, SODIUM LACTATE, POTASSIUM CHLORIDE, CALCIUM CHLORIDE: 600; 310; 30; 20 INJECTION, SOLUTION INTRAVENOUS at 13:31:00

## 2022-06-09 RX ADMIN — ONDANSETRON 4 MG: 2 INJECTION INTRAMUSCULAR; INTRAVENOUS at 14:07:00

## 2022-06-09 NOTE — ANESTHESIA POSTPROCEDURE EVALUATION
[facility] 34 Patient Status:  Hospital Outpatient Surgery   Age/Gender 55year old female MRN [id number]   Location [address] [facility] Attending Judy Pike MD   Hosp Day # 0 PCP Patrice Velasquez MD       Anesthesia Post-op Note    LAPAROSCOPIC TUBAL LIGATION VIA BILATERAL SALPINGECTOMIES, REMOVAL OF INTRAUTERINE DEVICE    Procedure Summary     Date: 06/09/22 Room / Location: [address] [place] [address] OR 18 / [facility] OR    Anesthesia Start: 9688 Anesthesia Stop: 0250    Procedure: LAPAROSCOPIC TUBAL LIGATION VIA BILATERAL SALPINGECTOMIES, REMOVAL OF INTRAUTERINE DEVICE (N/A Abdomen) Diagnosis: (DESIRE FOR STERILITY, RT OVARIAN CYST)    Surgeons: Judy Pike MD Anesthesiologist: Talita Warner MD    Anesthesia Type: general ASA Status: 2          Anesthesia Type: general    Vitals Value Taken Time   /55 06/09/22 1436   Temp 98 06/09/22 1436   Pulse 87 06/09/22 1436   Resp 20 06/09/22 1436   SpO2 100 06/09/22 1436       Patient Location: PACU    Anesthesia Type: general    Airway Patency: patent and extubated    Postop Pain Control: adequate    Mental Status: mildly sedated but able to meaningfully participate in the post-anesthesia evaluation    Nausea/Vomiting: none    Cardiopulmonary/Hydration status: stable euvolemic    Complications: no apparent anesthesia related complications    Postop vital signs: stable    Dental Exam: Unchanged from Preop    Patient to be discharged from PACU when criteria met.

## 2022-06-09 NOTE — ANESTHESIA PROCEDURE NOTES
Airway  Date/Time: 6/9/2022 1:37 PM  Urgency: elective    Airway not difficult    General Information and Staff    Patient location during procedure: OR  Anesthesiologist: Jasmina Cervantes MD  Performed: anesthesiologist     Indications and Patient Condition  Indications for airway management: anesthesia  Spontaneous Ventilation: absent  Sedation level: deep  Preoxygenated: yes  Patient position: sniffing  Mask difficulty assessment: 1 - vent by mask    Final Airway Details  Final airway type: endotracheal airway      Successful airway: ETT  Cuffed: yes   Successful intubation technique: direct laryngoscopy  Endotracheal tube insertion site: oral  Blade: Eulalio  Blade size: #3  ETT size (mm): 7.0    Cormack-Lehane Classification: grade IIA - partial view of glottis  Placement verified by: chest auscultation and capnometry   Measured from: lips  ETT to lips (cm): 21  Number of attempts at approach: 1

## 2022-06-09 NOTE — DISCHARGE SUMMARY
[facility]  Discharge Summary    [facility] Patient Status:  Hospital Outpatient Surgery    1975 MRN [id number]   [facility] SURGERY Attending Andre Reilly MD   Hosp Day # 0 PCP Henri Elizondo MD     Date of Admission: 2022    Date of Discharge: 2022    Admitting Diagnosis: DESIRE FOR STERILITY, RT OVARIAN CYST    Discharge Diagnosis: Patient Active Problem List:     Guttate psoriasis     H/O LEEP  - MITALI 3     IUD contraception - Paragard inserted 2010 - removed during TL in      S/P laparoscopy - Left ovarian cystectomy      Hyperparathyroidism [facility])     s/p lap  tubal ligation - via salpingectomies       Reason for Admission: See History and Physical    Hospital Course: uncomplicated    Consultations: none    Procedures: s/p laparoscopic TL via salpingectomies - removal of IUD    Complications: none    Disposition: Home or Self Care    Discharge Condition: Good    Discharge Medications: Current Discharge Medication List    START taking these medications    ibuprofen 600 MG Oral Tab  Take 1 tablet (600 mg total) by mouth every 6 (six) hours as needed for Pain. Qty: 30 tablet Refills: 1      CONTINUE these medications which have NOT CHANGED    escitalopram 10 MG Oral Tab  Take 10 mg by mouth daily. levothyroxine 50 MCG Oral Tab  Take 1 tablet (50 mcg total) by mouth daily. Qty: 90 tablet Refills: 0    Fluocinolone Acetonide 0.01 % Otic Oil  3 drops each ear daily for 30 days, repeat as necessary  Qty: 20 mL Refills: 11  Associated Diagnoses:Bilateral impacted cerumen; Nontoxic thyroid nodule    Amphetamine-Dextroamphet ER 20 MG Oral Capsule SR 24 Hr  Take 20 mg by mouth every morning. amphetamine-dextroamphetamine 10 MG Oral Tab  Take 10 mg by mouth 2 (two) times daily. Atomoxetine HCl 100 MG Oral Cap  Take by mouth daily. QUEtiapine 400 MG Oral Tab  Take 400 mg by mouth nightly.     Tretinoin 0.05 % External Cream  Apply qhs to affected area on face for acne  Qty: 45 g Refills: 5    CLONAZEPAM OR  Take 1 tablet by mouth daily as needed. Associated Diagnoses:Guttate psoriasis          Diet:  General    Activity:  Pelvic Rest and  routine post. Operative precautions    Motrin and Tylenol for pain    Follow up Visits:  Follow-up in 2 weeks      Other Discharge Instructions: Pelvic Rest, no heavy lifting    Verenice Ruby MD  6/9/2022  2:33 PM

## 2022-06-09 NOTE — OPERATIVE REPORT
PREOPERATIVE DIAGNOSIS: Desire for sterility          IUD removal          Ovarian Cyst    POSTOPERATIVE DIAGNOSIS: Same - No ovarian cyst    PROCEDURE PERFORMED: Laparoscopic cautery tubal ligation    SURGEON: Geoff    ANESTHESIA: General.    ESTIMATED BLOOD LOSS: 5 mL. INDICATIONS: Multiparity - desire for permanent birth control. FINDINGS:   An IUD string was seen and easily removed (Paragard). Both tubes were normal as were the ovaries. There was no evidence of any ovarian cyst as mentioned on CT but some free blood in the cul-de-sac suggestive of a recent ruptured cyst.  This was evacuated. The entire tubes were removed with the En-Seal including the frimbrea ovarica. PROCEDURE: The patient was taken to the operative suite and after appropriate counseling was placed under general anesthesia. Exam under anesthesia was performed. Reynolds was placed to dependent drainage. Using a side-opening speculum the IUD was removed with a ring Forceps. A [employer] uterine manipulator was placed in the uterus in the routine fashion. Surgeon's gloves were changed. She was placed in slight Trendelenburg, and a stab incision was made in the Infra-umbilical region to avoid the previous laparoscopic incision. While elevating the anterior abdominal wall, the Veress needle was inserted. The position was checked with a saline syringe. The resting pressure was 0 and maximum pressure did not exceed 15 mmHg. The abdomen was inflated with approximately 2-3 L of carbon dioxide. Next, the incision was extended. The Veress needle was removed. The bladeless 11 mm trocar was inserted through the incision. A 10 mm laparoscope was passed down its sheath. We were found to be within the peritoneal cavity without trauma to underlying visceral. Under direct vision, two 5 mm  ports were then placed in the lower quadrants. Systematic review of the pelvic anatomy was performed.  The EnSeal was introduced on the ipsilateral side while elevating the tube from the contralateral side to allow removal of each fallopian tube. Care was taken to include the fimbrea ovarica. The tubes were then removed via the 10 mm port observing with the 5 mm camera in the left lower quadrant port. Care was taken to avoid trauma to the surrounding structures. Hemostasis was excellent. The free blood in the pelvic was aspirated and irrigation was used to clean the pelvis. The Endo-close device was used to close the infra-umbilical fascia while observing with the 5 mm scope. Instruments were removed and the abdomen was deflated while the anesthesiologist gave the patient three deep breaths. The skin was approximated 4-0 Monocryl and surgical glue was placed over the incsions. The ulloa and uterine manipulator were removed from the vagina. The patient was moved to the supine position, awakened, and transferred to the recovery room in stable condition.     Complications: None

## 2022-09-11 ENCOUNTER — HOSPITAL ENCOUNTER (OUTPATIENT)
Age: 47
Discharge: HOME | End: 2022-09-11
Payer: COMMERCIAL

## 2022-09-11 VITALS
HEIGHT: 62 IN | BODY MASS INDEX: 25.76 KG/M2 | RESPIRATION RATE: 20 BRPM | SYSTOLIC BLOOD PRESSURE: 139 MMHG | DIASTOLIC BLOOD PRESSURE: 76 MMHG | OXYGEN SATURATION: 99 % | HEART RATE: 69 BPM | WEIGHT: 140 LBS | TEMPERATURE: 97 F

## 2022-09-11 DIAGNOSIS — R35.0 URINARY FREQUENCY: ICD-10-CM

## 2022-09-11 DIAGNOSIS — S91.032A: Primary | ICD-10-CM

## 2022-09-11 LAB
POCT BILIRUBIN URINE: NEGATIVE
POCT BLOOD URINE: NEGATIVE
POCT GLUCOSE URINE: NEGATIVE MG/DL
POCT KETONE URINE: NEGATIVE MG/DL
POCT NITRITE URINE: NEGATIVE
POCT PH URINE: 7 (ref 5–8)
POCT PROTEIN URINE: NEGATIVE MG/DL
POCT SPECIFIC GRAVITY URINE: 1.02
POCT URINE CLARITY: CLEAR
POCT URINE COLOR: YELLOW
POCT UROBILINOGEN URINE: 0.2 MG/DL

## 2022-09-11 PROCEDURE — 87086 URINE CULTURE/COLONY COUNT: CPT | Performed by: NURSE PRACTITIONER

## 2022-09-11 RX ORDER — CEPHALEXIN 500 MG/1
500 CAPSULE ORAL 3 TIMES DAILY
Qty: 21 CAPSULE | Refills: 0 | Status: SHIPPED | OUTPATIENT
Start: 2022-09-11 | End: 2022-09-18

## 2022-09-12 NOTE — ED NOTES
Called and lvm to call IC back for test results or view results in my chart. Phone number of ic provided.

## 2022-12-05 ENCOUNTER — APPOINTMENT (OUTPATIENT)
Dept: GENERAL RADIOLOGY | Age: 47
End: 2022-12-05
Attending: NURSE PRACTITIONER
Payer: COMMERCIAL

## 2022-12-05 ENCOUNTER — APPOINTMENT (OUTPATIENT)
Dept: CT IMAGING | Age: 47
End: 2022-12-05
Attending: NURSE PRACTITIONER
Payer: COMMERCIAL

## 2022-12-05 ENCOUNTER — HOSPITAL ENCOUNTER (OUTPATIENT)
Age: 47
Discharge: HOME | End: 2022-12-05
Payer: COMMERCIAL

## 2022-12-05 VITALS
HEIGHT: 63 IN | TEMPERATURE: 98 F | OXYGEN SATURATION: 98 % | BODY MASS INDEX: 24.8 KG/M2 | WEIGHT: 140 LBS | DIASTOLIC BLOOD PRESSURE: 70 MMHG | HEART RATE: 91 BPM | SYSTOLIC BLOOD PRESSURE: 117 MMHG | RESPIRATION RATE: 18 BRPM

## 2022-12-05 DIAGNOSIS — R10.9 FLANK PAIN: Primary | ICD-10-CM

## 2022-12-05 DIAGNOSIS — M54.9 MODERATE BACK PAIN: ICD-10-CM

## 2022-12-05 LAB
POCT BILIRUBIN URINE: NEGATIVE
POCT BLOOD URINE: NEGATIVE
POCT GLUCOSE URINE: NEGATIVE MG/DL
POCT KETONE URINE: NEGATIVE MG/DL
POCT LEUKOCYTE ESTERASE URINE: NEGATIVE
POCT NITRITE URINE: NEGATIVE
POCT PH URINE: 7 (ref 5–8)
POCT PROTEIN URINE: NEGATIVE MG/DL
POCT SPECIFIC GRAVITY URINE: 1.01
POCT URINE CLARITY: CLEAR
POCT URINE COLOR: YELLOW
POCT UROBILINOGEN URINE: 0.2 MG/DL

## 2022-12-05 PROCEDURE — 81002 URINALYSIS NONAUTO W/O SCOPE: CPT | Performed by: NURSE PRACTITIONER

## 2022-12-05 PROCEDURE — 99213 OFFICE O/P EST LOW 20 MIN: CPT | Performed by: NURSE PRACTITIONER

## 2022-12-05 PROCEDURE — 74176 CT ABD & PELVIS W/O CONTRAST: CPT | Performed by: NURSE PRACTITIONER

## 2022-12-05 PROCEDURE — A9150 MISC/EXPER NON-PRESCRIPT DRU: HCPCS | Performed by: NURSE PRACTITIONER

## 2022-12-05 PROCEDURE — 71046 X-RAY EXAM CHEST 2 VIEWS: CPT | Performed by: NURSE PRACTITIONER

## 2022-12-05 RX ORDER — ACETAMINOPHEN 500 MG
500 TABLET ORAL ONCE
Status: COMPLETED | OUTPATIENT
Start: 2022-12-05 | End: 2022-12-05

## 2022-12-05 RX ORDER — TRAMADOL HYDROCHLORIDE 50 MG/1
TABLET ORAL EVERY 6 HOURS PRN
Qty: 10 TABLET | Refills: 0 | Status: SHIPPED | OUTPATIENT
Start: 2022-12-05 | End: 2022-12-10

## 2022-12-05 RX ORDER — IBUPROFEN 600 MG/1
600 TABLET ORAL ONCE
Status: COMPLETED | OUTPATIENT
Start: 2022-12-05 | End: 2022-12-05

## 2024-10-04 ENCOUNTER — OFFICE VISIT (OUTPATIENT)
Facility: CLINIC | Age: 49
End: 2024-10-04
Payer: COMMERCIAL

## 2024-10-04 VITALS
DIASTOLIC BLOOD PRESSURE: 74 MMHG | WEIGHT: 138 LBS | HEIGHT: 63 IN | SYSTOLIC BLOOD PRESSURE: 112 MMHG | BODY MASS INDEX: 24.45 KG/M2

## 2024-10-04 DIAGNOSIS — Z01.419 ENCOUNTER FOR WELL WOMAN EXAM WITH ROUTINE GYNECOLOGICAL EXAM: Primary | ICD-10-CM

## 2024-10-04 DIAGNOSIS — Z13.220 SCREENING FOR HYPERLIPIDEMIA: ICD-10-CM

## 2024-10-04 DIAGNOSIS — Z98.51 H/O TUBAL LIGATION: ICD-10-CM

## 2024-10-04 DIAGNOSIS — Z80.41 FAMILY HISTORY OF OVARIAN CANCER: ICD-10-CM

## 2024-10-04 DIAGNOSIS — R31.29 OTHER MICROSCOPIC HEMATURIA: ICD-10-CM

## 2024-10-04 DIAGNOSIS — R35.0 FREQUENT URINATION: ICD-10-CM

## 2024-10-04 DIAGNOSIS — Z98.890 H/O LEEP: ICD-10-CM

## 2024-10-04 DIAGNOSIS — R35.0 URINARY FREQUENCY: ICD-10-CM

## 2024-10-04 DIAGNOSIS — N95.1 VASOMOTOR SYMPTOMS DUE TO MENOPAUSE: ICD-10-CM

## 2024-10-04 DIAGNOSIS — Z00.00 ANNUAL PHYSICAL EXAM: ICD-10-CM

## 2024-10-04 DIAGNOSIS — Z12.31 SCREENING MAMMOGRAM FOR BREAST CANCER: ICD-10-CM

## 2024-10-04 LAB
APPEARANCE: CLEAR
BILIRUBIN: NEGATIVE
GLUCOSE (URINE DIPSTICK): NEGATIVE MG/DL
KETONES (URINE DIPSTICK): NEGATIVE MG/DL
NITRITE, URINE: NEGATIVE
PH, URINE: 6.5 (ref 4.5–8)
PROTEIN (URINE DIPSTICK): NEGATIVE MG/DL
SPECIFIC GRAVITY: 1.01 (ref 1–1.03)
URINE-COLOR: YELLOW
UROBILINOGEN,SEMI-QN: 0.2 MG/DL (ref 0–1.9)

## 2024-10-04 PROCEDURE — 99396 PREV VISIT EST AGE 40-64: CPT | Performed by: OBSTETRICS & GYNECOLOGY

## 2024-10-04 PROCEDURE — 3078F DIAST BP <80 MM HG: CPT | Performed by: OBSTETRICS & GYNECOLOGY

## 2024-10-04 PROCEDURE — 3074F SYST BP LT 130 MM HG: CPT | Performed by: OBSTETRICS & GYNECOLOGY

## 2024-10-04 PROCEDURE — 87086 URINE CULTURE/COLONY COUNT: CPT | Performed by: OBSTETRICS & GYNECOLOGY

## 2024-10-04 PROCEDURE — 3008F BODY MASS INDEX DOCD: CPT | Performed by: OBSTETRICS & GYNECOLOGY

## 2024-10-04 PROCEDURE — 81002 URINALYSIS NONAUTO W/O SCOPE: CPT | Performed by: OBSTETRICS & GYNECOLOGY

## 2024-10-04 RX ORDER — METHYLPHENIDATE HYDROCHLORIDE 10 MG/1
10 TABLET ORAL 2 TIMES DAILY
COMMUNITY
Start: 2023-10-08

## 2024-10-04 RX ORDER — ESTRADIOL 0.5 MG/1
0.5 TABLET ORAL DAILY
Qty: 90 TABLET | Refills: 1 | Status: SHIPPED | OUTPATIENT
Start: 2024-10-04

## 2024-10-04 RX ORDER — LISDEXAMFETAMINE DIMESYLATE 60 MG/1
60 CAPSULE ORAL EVERY MORNING
COMMUNITY
Start: 2023-03-08

## 2024-10-04 RX ORDER — MEDROXYPROGESTERONE ACETATE 2.5 MG/1
2.5 TABLET ORAL DAILY
Qty: 90 TABLET | Refills: 1 | Status: SHIPPED | OUTPATIENT
Start: 2024-10-04

## 2024-10-04 NOTE — PROGRESS NOTES
GYN H&P     Genetic questionnaire reviewed with the patient and she will be referred for genetic counseling if the questionnaire had any positive results.    The [facility] intake form was also reviewed regarding contraception, menstrual periods, urinary health, and vaginal / sexual health    10/4/2024  10:00 AM    Chief Complaint   Patient presents with    Gynecologic Exam       HPI: Chichi is a 49 year old  No LMP recorded. Patient is perimenopausal.  (contraception: TL) here for her annual gyn exam.     She has no complaints.   Denies any pelvic or breast complaints.   Satisfied with current contraception.     Admits to mild hot flashes.     Noting frequency - microscopic hematuria    Previous encounters and chart reviewed.       Author: Jj Tellez MD Service: OB/Gyn Author Type: Physician   Filed: 2022  2:30 PM Date of Service: 2022  2:17 PM Status: Signed   : Jj Tellez MD (Physician)   PREOPERATIVE DIAGNOSIS: Desire for sterility                                                          IUD removal                                                          Ovarian Cyst     POSTOPERATIVE DIAGNOSIS: Same - No ovarian cyst     PROCEDURE PERFORMED: Laparoscopic cautery tubal ligation     SURGEON: Geoff     ANESTHESIA: General.     ESTIMATED BLOOD LOSS: 5 mL.     INDICATIONS: Multiparity - desire for permanent birth control.      FINDINGS:   An IUD string was seen and easily removed (Paragard).  Both tubes were normal as were the ovaries.  There was no evidence of any ovarian cyst as mentioned on CT but some free blood in the cul-de-sac suggestive of a recent ruptured cyst.  This was evacuated.  The entire tubes were removed with the En-Seal including the frimbrea ovarica.           OB History    Para Term  AB Living   2 2 2     2   SAB IAB Ectopic Multiple Live Births           2      # Outcome Date GA Lbr Evaristo/2nd Weight Sex Type Anes PTL Lv   2  Term 2007 39w0d    Vag-Spont   SANTOS   1 Term 1997 39w0d    Vag-Spont   SANTOS       GYN hx:   Menarche: 15  Period Cycle (Days): 28  Period Duration (Days): 12  Use of Birth Control (if yes, specify type): Tubal Ligation  Hx Prior Abnormal Pap: Yes  Pap Date: 04/19/22  Pap Result Notes: 10/19/2019 neg/neg    (2014 neg/neg, 2012 neg, 2010 neg)     (LEEP 2007 MITALI 3)  Follow Up Recommendation: 2025      Past Medical History:    Anxiety    Attention deficit disorder    Bipolar affective (HCC)    COVID-19    symptoms: fever, congestion, brain fog; s/s resolved - yes; not hospitalized    Depression    Disorder of thyroid    Hypothyroid    Visual impairment    contacts/glasses     Past Surgical History:   Procedure Laterality Date    Other      ovarian cyst    Other surgical history      parathyroid removed    Salpingectomy  06/09/2022    LAPAROSCOPIC TUBAL LIGATION VIA BILATERAL SALPINGECTOMIES, REMOVAL OF INTRAUTERINE DEVICE     Allergies   Allergen Reactions    Penicillins RASH     Current Outpatient Medications on File Prior to Visit   Medication Sig Dispense Refill    Lisdexamfetamine Dimesylate 60 MG Oral Cap Take 1 capsule (60 mg total) by mouth every morning.      RITALIN 10 MG Oral Tab Take 1 tablet (10 mg total) by mouth 2 (two) times daily.      levothyroxine 50 MCG Oral Tab Take 1 tablet (50 mcg total) by mouth daily. 90 tablet 0    Atomoxetine HCl 100 MG Oral Cap Take by mouth daily.      QUEtiapine 400 MG Oral Tab Take 1 tablet (400 mg total) by mouth nightly.      escitalopram 10 MG Oral Tab Take 10 mg by mouth daily. (Patient not taking: Reported on 10/4/2024)      Fluocinolone Acetonide 0.01 % Otic Oil 3 drops each ear daily for 30 days, repeat as necessary (Patient not taking: Reported on 12/5/2022) 20 mL 11    Tretinoin 0.05 % External Cream Apply qhs to affected area on face for acne (Patient not taking: Reported on 12/5/2022) 45 g 5    CLONAZEPAM OR Take 1 tablet by mouth daily as needed. (Patient not  taking: Reported on 12/5/2022)       No current facility-administered medications on file prior to visit.     No family history on file.  Social History     Socioeconomic History    Marital status:      Spouse name: Not on file    Number of children: Not on file    Years of education: Not on file    Highest education level: Not on file   Occupational History    Not on file   Tobacco Use    Smoking status: Every Day     Current packs/day: 0.25     Average packs/day: 0.3 packs/day for 10.0 years (2.5 ttl pk-yrs)     Types: Cigarettes     Passive exposure: Current    Smokeless tobacco: Never   Vaping Use    Vaping status: Never Used   Substance and Sexual Activity    Alcohol use: Not Currently    Drug use: Not Currently    Sexual activity: Yes     Partners: Male     Birth control/protection: Tubal Ligation   Other Topics Concern    Caffeine Concern Not Asked    Exercise Not Asked    Seat Belt Not Asked    Special Diet Not Asked    Stress Concern Not Asked    Weight Concern Not Asked   Social History Narrative    Not on file     Social Determinants of Health     Financial Resource Strain: Not on file   Food Insecurity: Not on file   Transportation Needs: Not on file   Physical Activity: Not on file   Stress: Not on file   Social Connections: Not on file   Housing Stability: Not on file       ROS:     Review of Systems:  General: denies fevers, chills, fatigue and malaise.   Eyes: no visual changes, denies headaches  ENT: no complaints, denies earaches, runny nose, epistaxis, throat pain or sore throat  Respiratory: denies SOB, dyspnea, cough or wheezing  Cardiovascular: denies chest pain, palpitations, exercise intolerance   GI: denies abdominal pain, diarrhea, constipation  : no complaints, denies dysuria, increased urinary frequency, no dyspareunia   Hematological/lymphatic: denies history of excessive bleeding or bruising, denies dizziness, lightheadedness.   Breast: denies rashes, skin changes, pain, lumps  or discharge   Psychiatric: denies depression, changes in sleep patterns, anxiety  Endocrine: denies hot or cold intolerance, mood changes   Neurological: denies changes in sight, smell, hearing or taste. Denies seizures or tremors  Immunological: denies allergies, denies anaphylaxis, or swollen lymph nodes  Musculoskeletal: denies joint pain, morning stiffness, decreased range of motion         O /74   Ht 63\"   Wt 138 lb (62.6 kg)   BMI 24.45 kg/m²         Wt Readings from Last 6 Encounters:   10/04/24 138 lb (62.6 kg)   12/05/22 140 lb (63.5 kg)   09/11/22 140 lb (63.5 kg)   06/09/22 136 lb 14.5 oz (62.1 kg)   04/24/22 140 lb (63.5 kg)   04/19/22 140 lb (63.5 kg)     Exam:   GENERAL: well developed, well nourished, in no apparent distress, oriented.  SKIN: no rashes, no suspicious lesions  HEENT: normal  NECK: supple; no thyromegaly, no adenopathy  LUNGS: clear to auscultation  CARDIOVASCULAR: normal S1, S2, RRR  BREASTS: soft, nontender, no palpable masses or nodes, no nipple discharge, no skin changes, no axillary adenopathy  ABDOMEN: no scars,  soft, non distended; non tender, no masses  PELVIC: External Genitalia: Normal appearing, no lesions.    Vagina: normal pink mucosa, no lesions, normal clear discharge.    Bladder well supported.  No  anterior or posterior hernias    Cervix: multiparous, no lesions , No CMT     Uterus: AVAF, mobile, non tender, normal size    Adnexa: non tender, no masses, normal size    Rectal: deferred  EXTREMITIES:  non tender without edema        A/P: Patient is 49 year old female with no complaints. Here for well woman exam.     Lab Results   Component Value Date    SPECGRAVITY 1.015 10/04/2024    PHURINE 6.5 10/04/2024    NITRITE Negative 10/04/2024         Patient counseled on:    Diet/exercise.      Self Breast Exams     Safe sex practices / and living environment     Vaccines:  Annual Flu, Tdap +/- Gardasil not recommended (up to 45 yrs).      Pneumococcal at 65 yrs  old, Shingles at 60 yrs old          Pap: neg/neg - Year:  2022  GC/Chlamydia:  na  Mammogram:  negative, 2022   Dexa:  na  Colonoscopy:  referred to GI  Lipid / Cholesterol:  ordered         Meds This Visit:    Requested Prescriptions     Signed Prescriptions Disp Refills    estradiol (ESTRACE) 0.5 MG Oral Tab 90 tablet 1     Sig: Take 1 tablet (0.5 mg total) by mouth daily.    medroxyPROGESTERone Acetate 2.5 MG Oral Tab 90 tablet 1     Sig: Take 1 tablet (2.5 mg total) by mouth daily.       1. Encounter for well woman exam with routine gynecological exam    2. Screening mammogram for breast cancer  - [facility] MARCIANO 2D+3D SCREENING BILAT (CPT=77067/18568); Future    3. Frequent urination  - Urine Dip in office [09320]  - Culture Urine; Future  - Culture Urine    4. Annual physical exam    5. H/O LEEP 2007 - MITALI 3    6. s/p lap  tubal ligation - via salpingectomies 2022    7. Other microscopic hematuria    8. Urinary frequency    9. Screening for hyperlipidemia  - CBC With Differential With Platelet; Future  - Fasting Glucose; Future  - LIPID PANEL W LDL/HDL RATIO; Future    10. Vasomotor symptoms due to menopause  - estradiol (ESTRACE) 0.5 MG Oral Tab; Take 1 tablet (0.5 mg total) by mouth daily.  Dispense: 90 tablet; Refill: 1  - medroxyPROGESTERone Acetate 2.5 MG Oral Tab; Take 1 tablet (2.5 mg total) by mouth daily.  Dispense: 90 tablet; Refill: 1    11. Family history of ovarian cancer  - Genetic Counselor Referral - [place])      Return in about 3 months (around 1/4/2025) for Office Visit.    Jj Tellez MD   10/4/2024  10:00 AM       This note was created by [employer] voice recognition. Errors in content may be related to improper recognition by the system; efforts to review and correct have been done but errors may still exist. Please contact me with any questions.    Note to patient and family   The 21st Century Cures Act makes medical notes available to patients in the interest of transparency.   However, please be advised that this is a medical document.  It is intended as qvjj-do-jirl communication.  It is written and medical language may contain abbreviations or verbiage that are technical and unfamiliar.  It may appear blunt or direct.  Medical documents are intended to carry relevant information, facts as evident, and the clinical opinion of the practitioner.

## 2025-03-29 DIAGNOSIS — N95.1 VASOMOTOR SYMPTOMS DUE TO MENOPAUSE: ICD-10-CM

## 2025-03-31 RX ORDER — MEDROXYPROGESTERONE ACETATE 2.5 MG/1
2.5 TABLET ORAL DAILY
Qty: 90 TABLET | Refills: 1 | OUTPATIENT
Start: 2025-03-31

## 2025-03-31 RX ORDER — ESTRADIOL 0.5 MG/1
0.5 TABLET ORAL DAILY
Qty: 90 TABLET | Refills: 1 | OUTPATIENT
Start: 2025-03-31

## 2025-03-31 NOTE — TELEPHONE ENCOUNTER
Called and left voicemail to schedule follow up appointment with our office prior to refilling medication refill request.

## 2025-05-02 ENCOUNTER — TELEPHONE (OUTPATIENT)
Facility: CLINIC | Age: 50
End: 2025-05-02

## 2025-05-02 DIAGNOSIS — N95.1 VASOMOTOR SYMPTOMS DUE TO MENOPAUSE: ICD-10-CM

## 2025-05-02 NOTE — TELEPHONE ENCOUNTER
Lvm to let patient know we were changing her appt to an exam to discuss HRT. Her last WWE was 10/4/2024.

## 2025-05-02 NOTE — TELEPHONE ENCOUNTER
Spoke with patient about her needing her medication refilled, that her pharmacy can send us a request since her last annual was 10/4/2024. No need for a visit just for this, rescheduled that appt for October for her next WWE

## 2025-05-05 RX ORDER — ESTRADIOL 0.5 MG/1
0.5 TABLET ORAL DAILY
Qty: 90 TABLET | Refills: 2 | Status: SHIPPED | OUTPATIENT
Start: 2025-05-05

## 2025-05-05 RX ORDER — MEDROXYPROGESTERONE ACETATE 2.5 MG/1
2.5 TABLET ORAL DAILY
Qty: 90 TABLET | Refills: 2 | Status: SHIPPED | OUTPATIENT
Start: 2025-05-05

## (undated) DEVICE — STERILE SYNTHETIC POLYISOPRENE POWDER-FREE SURGICAL GLOVES WITH HYDROGEL COATING: Brand: PROTEXIS

## (undated) DEVICE — MANIPULATOR CATH ESCP KRNR

## (undated) DEVICE — 40580 - THE PINK PAD - ADVANCED TRENDELENBURG POSITIONING KIT: Brand: 40580 - THE PINK PAD - ADVANCED TRENDELENBURG POSITIONING KIT

## (undated) DEVICE — SUT MONOCRYL 4-0 PS-2 Y496G

## (undated) DEVICE — ENSEAL X1 TISSUE SEALER, CURVED JAW, 37 CM SHAFT LENGTH: Brand: ENSEAL

## (undated) DEVICE — [HIGH FLOW INSUFFLATOR,  DO NOT USE IF PACKAGE IS DAMAGED,  KEEP DRY,  KEEP AWAY FROM SUNLIGHT,  PROTECT FROM HEAT AND RADIOACTIVE SOURCES.]: Brand: PNEUMOSURE

## (undated) DEVICE — SLEEVE KENDALL SCD EXPRESS MED

## (undated) DEVICE — CLOSURE EXOFIN 1.0ML

## (undated) DEVICE — BAG DRAIN INFECTION CNTRL 2000

## (undated) DEVICE — SUT VICRYL 0 J608H

## (undated) DEVICE — PLUMEPORT ACTIV LAPAROSCOPIC SMOKE FILTRATION DEVICE: Brand: PLUMEPORT ACTIVE

## (undated) DEVICE — TROCAR: Brand: KII® SLEEVE

## (undated) DEVICE — TROCAR: Brand: KII FIOS FIRST ENTRY

## (undated) DEVICE — GOWN,SIRUS,FABRIC-REINFORCED,X-LARGE: Brand: MEDLINE

## (undated) DEVICE — SOLUTION  .9 1000ML BTL

## (undated) DEVICE — BAG URINE URIMETER

## (undated) DEVICE — STERILE POLYISOPRENE POWDER-FREE SURGICAL GLOVES: Brand: PROTEXIS

## (undated) DEVICE — GYN LAP/ROBOTIC: Brand: MEDLINE INDUSTRIES, INC.

## (undated) DEVICE — INSUFFLATION NEEDLE TO ESTABLISH PNEUMOPERITONEUM.: Brand: INSUFFLATION NEEDLE